# Patient Record
Sex: MALE | Race: WHITE | NOT HISPANIC OR LATINO | Employment: PART TIME | URBAN - METROPOLITAN AREA
[De-identification: names, ages, dates, MRNs, and addresses within clinical notes are randomized per-mention and may not be internally consistent; named-entity substitution may affect disease eponyms.]

---
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
## 2019-05-03 ENCOUNTER — OFFICE VISIT (OUTPATIENT)
Dept: MEDICAL GROUP | Facility: MEDICAL CENTER | Age: 19
End: 2019-05-03
Payer: COMMERCIAL

## 2019-05-03 VITALS
SYSTOLIC BLOOD PRESSURE: 126 MMHG | OXYGEN SATURATION: 100 % | WEIGHT: 190 LBS | DIASTOLIC BLOOD PRESSURE: 78 MMHG | TEMPERATURE: 98.2 F | BODY MASS INDEX: 24.38 KG/M2 | HEART RATE: 74 BPM | RESPIRATION RATE: 18 BRPM | HEIGHT: 74 IN

## 2019-05-03 DIAGNOSIS — Z00.00 HEALTHCARE MAINTENANCE: ICD-10-CM

## 2019-05-03 PROBLEM — J34.3 HYPERTROPHY OF NASAL TURBINATES: Status: RESOLVED | Noted: 2017-07-28 | Resolved: 2019-05-03

## 2019-05-03 PROBLEM — J34.2 DEVIATED NASAL SEPTUM: Status: RESOLVED | Noted: 2017-07-28 | Resolved: 2019-05-03

## 2019-05-03 PROCEDURE — 99395 PREV VISIT EST AGE 18-39: CPT | Performed by: FAMILY MEDICINE

## 2019-05-03 ASSESSMENT — PATIENT HEALTH QUESTIONNAIRE - PHQ9: CLINICAL INTERPRETATION OF PHQ2 SCORE: 0

## 2019-05-03 NOTE — ASSESSMENT & PLAN NOTE
Immunizations: up to date.     Patient tries to eat well and regularly exercises. He engages in cardiovascular exercise and strength training.

## 2019-05-03 NOTE — PROGRESS NOTES
"[facility]  Progress Note  Established Patient    Subjective:   Kumar López is a 19 y.o. male here today for a wellness exam. The patient is accompanied by his mother.     Healthcare maintenance  Immunizations: up to date.     Patient tries to eat well and regularly exercises. He engages in cardiovascular exercise and strength training.       Current Outpatient Prescriptions on File Prior to Visit   Medication Sig Dispense Refill   • therapeutic multivitamin-minerals (THERAGRAN-M) Tab Take 1 Tab by mouth every day.       No current facility-administered medications on file prior to visit.        Past Medical History:   Diagnosis Date   • Allergy     seasonal   • Unspecified orthopedic aftercare     pt with fractures of growth plates bilat legs       Allergies: Patient has no known allergies.    Surgical History:  has a past surgical history that includes other; wrist orif (10/24/2012); hardware removal ortho (11/5/2013); scar revision (11/5/2013); scar excision (11/5/2013); septoplasty (7/28/2017); turbinoplasty (Bilateral, 7/28/2017); and tonsillectomy.    Family History: family history includes Asthma in his father; Heart Disease in his unknown relative; Hyperlipidemia in his mother; Hypertension in his unknown relative; No Known Problems in his brother and sister.    Social History:  reports that he has never smoked. He has never used smokeless tobacco. He reports that he does not drink alcohol or use drugs.    ROS: no fever.        Objective:     Vitals:    05/03/19 1016   BP: 126/78   BP Location: Left arm   Patient Position: Sitting   BP Cuff Size: Adult   Pulse: 74   Resp: 18   Temp: 36.8 °C (98.2 °F)   TempSrc: Temporal   SpO2: 100%   Weight: 86.2 kg (190 lb)   Height: 1.88 m (6' 2\")       Physical Exam:  General: alert in no apparent distress.   Cardio: regular rate and rhythm, no murmurs, rubs or gallops.   Resp: CTAB no w/r/r.   ENMT: Tympanic members normal bilaterally.  No pharyngeal " erythema, no tonsillar exudates.  Uvula midline.  No anterior cervical lymphadenopathy.  GI: Soft, nontender, nondistended.        Assessment and Plan:     1. Healthcare maintenance  - see HPI.   - discussed diet and exercise.         Followup: Return in about 1 year (around 5/3/2020), or if symptoms worsen or fail to improve, for Wellness Visit, Long.

## 2019-08-29 ENCOUNTER — OFFICE VISIT (OUTPATIENT)
Dept: URGENT CARE | Facility: MEDICAL CENTER | Age: 19
End: 2019-08-29
Payer: COMMERCIAL

## 2019-08-29 VITALS
BODY MASS INDEX: 22.97 KG/M2 | TEMPERATURE: 98.3 F | SYSTOLIC BLOOD PRESSURE: 108 MMHG | WEIGHT: 179 LBS | HEIGHT: 74 IN | OXYGEN SATURATION: 98 % | HEART RATE: 73 BPM | DIASTOLIC BLOOD PRESSURE: 68 MMHG

## 2019-08-29 DIAGNOSIS — J00 NASOPHARYNGITIS ACUTE: ICD-10-CM

## 2019-08-29 LAB
FLUAV+FLUBV AG SPEC QL IA: NORMAL
INT CON NEG: NORMAL
INT CON POS: NORMAL

## 2019-08-29 PROCEDURE — 87804 INFLUENZA ASSAY W/OPTIC: CPT | Performed by: PHYSICIAN ASSISTANT

## 2019-08-29 PROCEDURE — 99214 OFFICE O/P EST MOD 30 MIN: CPT | Performed by: PHYSICIAN ASSISTANT

## 2019-08-29 RX ORDER — LIDOCAINE HYDROCHLORIDE 20 MG/ML
SOLUTION OROPHARYNGEAL
Qty: 100 ML | Refills: 0 | Status: SHIPPED | OUTPATIENT
Start: 2019-08-29 | End: 2019-08-30

## 2019-08-29 RX ORDER — BENZONATATE 100 MG/1
200 CAPSULE ORAL 3 TIMES DAILY PRN
Qty: 60 CAP | Refills: 0 | Status: SHIPPED | OUTPATIENT
Start: 2019-08-29 | End: 2019-08-30

## 2019-08-29 ASSESSMENT — ENCOUNTER SYMPTOMS
MYALGIAS: 1
COUGH: 1
DIARRHEA: 0
ABDOMINAL PAIN: 0
HEADACHES: 1
VOMITING: 0
SORE THROAT: 1
WHEEZING: 0
FEVER: 1
BODY ACHES: 1
SPUTUM PRODUCTION: 0
CHILLS: 1
FATIGUE: 1
NAUSEA: 0
SHORTNESS OF BREATH: 0

## 2019-08-30 ENCOUNTER — OFFICE VISIT (OUTPATIENT)
Dept: URGENT CARE | Facility: CLINIC | Age: 19
End: 2019-08-30
Payer: COMMERCIAL

## 2019-08-30 VITALS
BODY MASS INDEX: 22.77 KG/M2 | HEIGHT: 74 IN | OXYGEN SATURATION: 99 % | DIASTOLIC BLOOD PRESSURE: 70 MMHG | WEIGHT: 177.4 LBS | TEMPERATURE: 98 F | SYSTOLIC BLOOD PRESSURE: 110 MMHG | HEART RATE: 84 BPM | RESPIRATION RATE: 16 BRPM

## 2019-08-30 DIAGNOSIS — K20.90 ESOPHAGITIS: ICD-10-CM

## 2019-08-30 PROCEDURE — 99214 OFFICE O/P EST MOD 30 MIN: CPT | Performed by: PHYSICIAN ASSISTANT

## 2019-08-30 RX ORDER — SUCRALFATE ORAL 1 G/10ML
1 SUSPENSION ORAL
Qty: 280 ML | Refills: 0 | Status: SHIPPED | OUTPATIENT
Start: 2019-08-30 | End: 2019-09-06

## 2019-08-30 RX ORDER — RANITIDINE 150 MG/1
150 TABLET ORAL 2 TIMES DAILY
Qty: 28 TAB | Refills: 0 | Status: SHIPPED | OUTPATIENT
Start: 2019-08-30 | End: 2019-09-13

## 2019-08-30 NOTE — PROGRESS NOTES
"Chief Complaint   Patient presents with   • Chest Pain     x 4 days, mid chest pain, chest tightness, wheezing, shortness of breath, fatigue and bodyaches.  Lost 13 Lb  in 4 days       HISTORY OF PRESENT ILLNESS: Patient is a 19 y.o. male who presents today for about 4 days of worsening mid chest pain, fatigue, diarrhea, vomiting.   Patient states that he has only had a few episodes of vomiting and this came after he started having the symptoms of chest discomfort.  This discomfort is reliably noted with swallowing. He notes both pain and sensation that \"food is getting stuck\"  He notes this sensation with solids primarily but slightly with liquids.   He was seen in  and dx with viral URI however reports he really has not had any cold symptoms.   He does note some slight pleuritic pain, same area as pain when he swallows.   Has not really been coughing.  No overt abdominal pain.  No hx of GERD or reflux issues.  Lost about 12 pounds in last 4 days.    He was given tessalon for his coughing at  yesterday but has not helped.  No hematemesis.  No recent use of NSAIDs.  No tarry stools.     Patient Active Problem List    Diagnosis Date Noted   • Healthcare maintenance 05/03/2019       Allergies:Patient has no known allergies.    Current Outpatient Medications Ordered in Epic   Medication Sig Dispense Refill   • therapeutic multivitamin-minerals (THERAGRAN-M) Tab Take 1 Tab by mouth every day.       No current [facility]-ordered facility-administered medications on file.        Past Medical History:   Diagnosis Date   • Allergy     seasonal   • Unspecified orthopedic aftercare     pt with fractures of growth plates bilat legs       Social History     Tobacco Use   • Smoking status: Never Smoker   • Smokeless tobacco: Never Used   Substance Use Topics   • Alcohol use: No   • Drug use: No       Family Status   Relation Name Status   • Unknown  (Not Specified)   • Unknown  (Not Specified)   • Mo  Alive   • Fa  Alive   • Sis  " "Alive   • Bro  Alive     Family History   Problem Relation Age of Onset   • Hypertension Unknown    • Heart Disease Unknown    • Hyperlipidemia Mother    • Asthma Father    • No Known Problems Sister    • No Known Problems Brother        ROS:  Review of Systems   Constitutional: SEE HPI  HENT: Negative for ear pain, nosebleeds, congestion, sore throat and neck pain.    Eyes: Negative for blurred vision.   Respiratory: Negative for cough, sputum production, shortness of breath and wheezing.    Cardiovascular: SEE HPI  Gastrointestinal: SEE HPI  Genitourinary: Negative for dysuria, urgency and frequency.   All other systems reviewed and are negative.       Exam:  /70 (BP Location: Left arm, Patient Position: Sitting, BP Cuff Size: Large adult)   Pulse 100   Temp 36.7 °C (98 °F) (Temporal)   Resp 16   Ht 1.88 m (6' 2\")   Wt 80.5 kg (177 lb 6.4 oz)   SpO2 99%   General:  Well nourished, well developed male in NAD  Eyes: PERRLA, EOM within normal limits, no conjunctival injection, no scleral icterus, visual fields and acuity grossly intact.  Ears: Normal shape and symmetry, no tenderness, no discharge. External canals are without any significant edema or erythema. Tympanic membranes are without any inflammation, no effusion. Gross auditory acuity is intact  Nose: Symmetrical, sinuses without tenderness, no discharge.   Mouth: reasonable hygiene, no erythema exudates or tonsillar enlargement.  Neck: no masses, range of motion within normal limits, no tracheal deviation. No lymphadenopathy  Pulmonary: Normal respiratory effort, no wheezes, crackles, or rhonchi.  Cardiovascular: regular rate and rhythm without murmurs, rubs, or gallops.  Abdomen: Soft, nontender, nondistended. Normal bowel sounds. No hepatosplenomegaly or masses, or hernias. No rebound or guarding.  Skin: No visible rashes or lesion. Warm, pink, dry.   Extremities: no clubbing, cyanosis, or edema.  Neuro: A&O x 3. Speech normal/clear.  Normal " gait.         Assessment/Plan:  1. Esophagitis  hyoscyamine-maalox plus-lidocaine viscous (GI COCKTAIL)    raNITidine (ZANTAC) 150 MG Tab    sucralfate (CARAFATE) 1 GM/10ML Suspension    EKG       -vitals WNL. Patient is afebrile and well appearing.   -EKG without evidence of pericarditis or other acute cardiac changes.   -patient responded well to GI cocktail noting that his painful swallowing resolved completely with this.   -consistent with gastritis with possible esophagitis at this time with unclear etiology, possible viral.   Symptoms of dysphagia/odynophagia.    -trial of medication as above with plan to have GI follow prn.   -ER precautions for failure to improve for more work up advised.  Declines labs today.        Supportive care, differential diagnoses, and indications for immediate follow-up discussed with patient.   Pathogenesis of diagnosis discussed including typical length and natural progression.   Instructed to return to clinic or nearest emergency department for any change in condition, further concerns, or worsening of symptoms.  Patient states understanding of the plan of care and discharge instructions.  Instructed to make an appointment, for follow up, with their primary care provider.      Jailene Sears P.A.-C.

## 2019-08-30 NOTE — PROGRESS NOTES
"Subjective:   Kumar López is a 19 y.o. male who presents for Fatigue; Generalized Body Aches; and Pharyngitis    This is a new problem.  Patient presents urgent care with 2-day history of fatigue, generalized body aches, perceived fever, chills, sore throat and congestion.  Patient has had no known exposure to influenza.  He did just recently resume college courses and has been around a lot of people.  Patient did take some DayQuil for his symptoms with no real improvement in symptoms.      Fatigue   Associated symptoms include chills, congestion, coughing, fatigue, a fever, headaches, myalgias and a sore throat. Pertinent negatives include no abdominal pain, chest pain, nausea, rash or vomiting.   Generalized Body Aches   Associated symptoms include chills, congestion, coughing, fatigue, a fever, headaches, myalgias and a sore throat. Pertinent negatives include no abdominal pain, chest pain, nausea, rash or vomiting.   Pharyngitis    Associated symptoms include congestion, coughing and headaches. Pertinent negatives include no abdominal pain, diarrhea, ear pain, shortness of breath or vomiting.     Review of Systems   Constitutional: Positive for chills, fatigue, fever and malaise/fatigue.   HENT: Positive for congestion and sore throat. Negative for ear pain.    Respiratory: Positive for cough. Negative for sputum production, shortness of breath and wheezing.    Cardiovascular: Negative for chest pain.   Gastrointestinal: Negative for abdominal pain, diarrhea, nausea and vomiting.   Musculoskeletal: Positive for myalgias.   Skin: Negative for rash.   Neurological: Positive for headaches.   All other systems reviewed and are negative.    No Known Allergies     Objective:   /68 (BP Location: Right arm, Patient Position: Sitting)   Pulse 73   Temp 36.8 °C (98.3 °F) (Temporal)   Ht 1.88 m (6' 2\")   Wt 81.2 kg (179 lb)   SpO2 98%   BMI 22.98 kg/m²   Physical Exam   Constitutional: He is oriented to " person, place, and time. He appears well-developed and well-nourished. He does not appear ill. No distress.   HENT:   Head: Normocephalic and atraumatic.   Right Ear: Tympanic membrane and external ear normal.   Left Ear: Tympanic membrane and external ear normal.   Nose: Mucosal edema and rhinorrhea present. Right sinus exhibits no maxillary sinus tenderness and no frontal sinus tenderness. Left sinus exhibits no maxillary sinus tenderness and no frontal sinus tenderness.   Mouth/Throat: Uvula is midline and mucous membranes are normal. Posterior oropharyngeal erythema present. No oropharyngeal exudate. Tonsils are 1+ on the right. Tonsils are 1+ on the left. No tonsillar exudate.   EAC's with small amount of dried flaky cerumen, this is gently removed with ear curette but not fully removed   Eyes: Pupils are equal, round, and reactive to light. Conjunctivae and EOM are normal.   Neck: Normal range of motion. Neck supple.   Cardiovascular: Normal rate, regular rhythm and normal heart sounds. Exam reveals no friction rub.   No murmur heard.  Pulmonary/Chest: Effort normal and breath sounds normal. No respiratory distress.   Abdominal: Soft. Bowel sounds are normal. There is no hepatosplenomegaly. There is no tenderness.   Musculoskeletal: Normal range of motion.   Lymphadenopathy:        Head (right side): No submental, no submandibular and no tonsillar adenopathy present.        Head (left side): No submental, no submandibular and no tonsillar adenopathy present.     He has no cervical adenopathy.        Right: No supraclavicular adenopathy present.        Left: No supraclavicular adenopathy present.   Neurological: He is alert and oriented to person, place, and time. He has normal strength. No cranial nerve deficit or sensory deficit. Coordination normal.   Skin: Skin is warm and dry. No rash noted.   Psychiatric: He has a normal mood and affect. Judgment normal.   Vitals reviewed.          Assessment/Plan:    Assessment    1. Nasopharyngitis acute  - POCT Influenza A/B  - lidocaine (XYLOCAINE) 2 % Solution; Use as directed  Dispense: 100 mL; Refill: 0  - benzonatate (TESSALON) 100 MG Cap; Take 2 Caps by mouth 3 times a day as needed.  Dispense: 60 Cap; Refill: 0    Testing for influenza is negative.  Symptom medic, supportive care.  Increase fluids, rest.  Reassurance provided.  Patient may use over-the-counter cough and cold medication as he does not have a history of hypertension.  Patient is given a prescription for viscous lidocaine with printed instructions on how to create his own Magic mouthwash of which he may use a 10 mL solution as a swish gargle and spit every 6 hours as needed for sore throat.  Patient is also given Tessalon Perles as needed for cough.  Recommend nasal saline rinse, Flonase nasal spray and Mucinex.    Differential diagnosis, natural history, supportive care, and indications for immediate follow-up discussed.    If not improving in 3-5 days, F/U with PCP or return to  or sooner if worsens  Red flag warning symptoms and strict ER/follow-up precautions given.  The patient demonstrated a good understanding and agreed with the treatment plan.  Please note that this note was created using voice recognition speech to text software. Every effort has been made to correct obvious errors.  However, I expect there are errors of grammar and possibly context that were not discovered prior to finalizing the note  KARLO Renteria PA-C

## 2019-08-30 NOTE — PATIENT INSTRUCTIONS
"Viral Syndrome  You or your child has Viral Syndrome. It is the most common infection causing \"colds\" and infections in the nose, throat, sinuses, and breathing tubes. Sometimes the infection causes nausea, vomiting, or diarrhea. The germ that causes the infection is a virus. No antibiotic or other medicine will kill it. There are medicines that you can take to make you or your child more comfortable.   HOME CARE INSTRUCTIONS   · Rest in bed until you start to feel better.   · If you have diarrhea or vomiting, eat small amounts of crackers and toast. Soup is helpful.   · Do not give aspirin or medicine that contains aspirin to children.   · Only take over-the-counter or prescription medicines for pain, discomfort, or fever as directed by your caregiver.   SEEK IMMEDIATE MEDICAL CARE IF:   · You or your child has not improved within one week.   · You or your child has pain that is not at least partially relieved by over-the-counter medicine.   · Thick, colored mucus or blood is coughed up.   · Discharge from the nose becomes thick yellow or green.   · Diarrhea or vomiting gets worse.   · There is any major change in your or your child's condition.   · You or your child develops a skin rash, stiff neck, severe headache, or are unable to hold down food or fluid.   · You or your child has an oral temperature above 102° F (38.9° C), not controlled by medicine.   · Your baby is older than 3 months with a rectal temperature of 102° F (38.9° C) or higher.   · Your baby is 3 months old or younger with a rectal temperature of 100.4° F (38° C) or higher.   Document Released: 12/03/2007 Document Revised: 03/11/2013 Document Reviewed: 12/03/2008  [employer] Patient Information ©2013 [employer].  "

## 2019-08-31 ENCOUNTER — APPOINTMENT (OUTPATIENT)
Dept: RADIOLOGY | Facility: MEDICAL CENTER | Age: 19
End: 2019-08-31
Attending: EMERGENCY MEDICINE
Payer: COMMERCIAL

## 2019-08-31 ENCOUNTER — HOSPITAL ENCOUNTER (EMERGENCY)
Facility: MEDICAL CENTER | Age: 19
End: 2019-08-31
Attending: EMERGENCY MEDICINE
Payer: COMMERCIAL

## 2019-08-31 VITALS
TEMPERATURE: 98.2 F | RESPIRATION RATE: 16 BRPM | HEART RATE: 70 BPM | DIASTOLIC BLOOD PRESSURE: 72 MMHG | WEIGHT: 175.93 LBS | OXYGEN SATURATION: 98 % | HEIGHT: 74 IN | BODY MASS INDEX: 22.58 KG/M2 | SYSTOLIC BLOOD PRESSURE: 129 MMHG

## 2019-08-31 DIAGNOSIS — K21.9 GASTROESOPHAGEAL REFLUX DISEASE, ESOPHAGITIS PRESENCE NOT SPECIFIED: ICD-10-CM

## 2019-08-31 DIAGNOSIS — K44.9 HIATAL HERNIA: ICD-10-CM

## 2019-08-31 LAB
ALBUMIN SERPL BCP-MCNC: 4.9 G/DL (ref 3.2–4.9)
ALBUMIN/GLOB SERPL: 1.7 G/DL
ALP SERPL-CCNC: 66 U/L (ref 30–99)
ALT SERPL-CCNC: 11 U/L (ref 2–50)
ANION GAP SERPL CALC-SCNC: 14 MMOL/L (ref 0–11.9)
AST SERPL-CCNC: 12 U/L (ref 12–45)
BASOPHILS # BLD AUTO: 0.3 % (ref 0–1.8)
BASOPHILS # BLD: 0.03 K/UL (ref 0–0.12)
BILIRUB SERPL-MCNC: 0.9 MG/DL (ref 0.1–1.5)
BUN SERPL-MCNC: 14 MG/DL (ref 8–22)
CALCIUM SERPL-MCNC: 9.7 MG/DL (ref 8.5–10.5)
CHLORIDE SERPL-SCNC: 101 MMOL/L (ref 96–112)
CO2 SERPL-SCNC: 23 MMOL/L (ref 20–33)
CREAT SERPL-MCNC: 0.96 MG/DL (ref 0.5–1.4)
EOSINOPHIL # BLD AUTO: 0.13 K/UL (ref 0–0.51)
EOSINOPHIL NFR BLD: 1.4 % (ref 0–6.9)
ERYTHROCYTE [DISTWIDTH] IN BLOOD BY AUTOMATED COUNT: 36 FL (ref 35.9–50)
GLOBULIN SER CALC-MCNC: 2.9 G/DL (ref 1.9–3.5)
GLUCOSE SERPL-MCNC: 96 MG/DL (ref 65–99)
HCT VFR BLD AUTO: 50.8 % (ref 42–52)
HGB BLD-MCNC: 18 G/DL (ref 14–18)
IMM GRANULOCYTES # BLD AUTO: 0.01 K/UL (ref 0–0.11)
IMM GRANULOCYTES NFR BLD AUTO: 0.1 % (ref 0–0.9)
LIPASE SERPL-CCNC: 49 U/L (ref 11–82)
LYMPHOCYTES # BLD AUTO: 1.61 K/UL (ref 1–4.8)
LYMPHOCYTES NFR BLD: 17.7 % (ref 22–41)
MCH RBC QN AUTO: 29.7 PG (ref 27–33)
MCHC RBC AUTO-ENTMCNC: 35.4 G/DL (ref 33.7–35.3)
MCV RBC AUTO: 83.8 FL (ref 81.4–97.8)
MONOCYTES # BLD AUTO: 1.06 K/UL (ref 0–0.85)
MONOCYTES NFR BLD AUTO: 11.6 % (ref 0–13.4)
NEUTROPHILS # BLD AUTO: 6.27 K/UL (ref 1.82–7.42)
NEUTROPHILS NFR BLD: 68.9 % (ref 44–72)
NRBC # BLD AUTO: 0 K/UL
NRBC BLD-RTO: 0 /100 WBC
PLATELET # BLD AUTO: 208 K/UL (ref 164–446)
PMV BLD AUTO: 9.8 FL (ref 9–12.9)
POTASSIUM SERPL-SCNC: 3.8 MMOL/L (ref 3.6–5.5)
PROT SERPL-MCNC: 7.8 G/DL (ref 6–8.2)
RBC # BLD AUTO: 6.06 M/UL (ref 4.7–6.1)
SODIUM SERPL-SCNC: 138 MMOL/L (ref 135–145)
TROPONIN T SERPL-MCNC: <6 NG/L (ref 6–19)
WBC # BLD AUTO: 9.1 K/UL (ref 4.8–10.8)

## 2019-08-31 PROCEDURE — 80053 COMPREHEN METABOLIC PANEL: CPT

## 2019-08-31 PROCEDURE — 71275 CT ANGIOGRAPHY CHEST: CPT

## 2019-08-31 PROCEDURE — 700117 HCHG RX CONTRAST REV CODE 255: Performed by: EMERGENCY MEDICINE

## 2019-08-31 PROCEDURE — A9270 NON-COVERED ITEM OR SERVICE: HCPCS | Performed by: EMERGENCY MEDICINE

## 2019-08-31 PROCEDURE — C9113 INJ PANTOPRAZOLE SODIUM, VIA: HCPCS | Performed by: EMERGENCY MEDICINE

## 2019-08-31 PROCEDURE — 700111 HCHG RX REV CODE 636 W/ 250 OVERRIDE (IP): Performed by: EMERGENCY MEDICINE

## 2019-08-31 PROCEDURE — 99284 EMERGENCY DEPT VISIT MOD MDM: CPT

## 2019-08-31 PROCEDURE — 83690 ASSAY OF LIPASE: CPT

## 2019-08-31 PROCEDURE — 96374 THER/PROPH/DIAG INJ IV PUSH: CPT

## 2019-08-31 PROCEDURE — 700102 HCHG RX REV CODE 250 W/ 637 OVERRIDE(OP): Performed by: EMERGENCY MEDICINE

## 2019-08-31 PROCEDURE — 84484 ASSAY OF TROPONIN QUANT: CPT

## 2019-08-31 PROCEDURE — 85025 COMPLETE CBC W/AUTO DIFF WBC: CPT

## 2019-08-31 RX ORDER — PANTOPRAZOLE SODIUM 40 MG/1
40 TABLET, DELAYED RELEASE ORAL DAILY
Qty: 30 TAB | Refills: 0 | Status: SHIPPED | OUTPATIENT
Start: 2019-08-31 | End: 2020-09-04

## 2019-08-31 RX ORDER — PANTOPRAZOLE SODIUM 40 MG/10ML
40 INJECTION, POWDER, LYOPHILIZED, FOR SOLUTION INTRAVENOUS ONCE
Status: COMPLETED | OUTPATIENT
Start: 2019-08-31 | End: 2019-08-31

## 2019-08-31 RX ADMIN — IOHEXOL 80 ML: 350 INJECTION, SOLUTION INTRAVENOUS at 15:35

## 2019-08-31 RX ADMIN — PANTOPRAZOLE SODIUM 40 MG: 40 INJECTION, POWDER, LYOPHILIZED, FOR SOLUTION INTRAVENOUS at 14:35

## 2019-08-31 RX ADMIN — LIDOCAINE HYDROCHLORIDE 30 ML: 20 SOLUTION OROPHARYNGEAL at 14:10

## 2019-08-31 NOTE — ED PROVIDER NOTES
ED Provider Note    Scribed for Joe Singh M.D. by Mckenzie Sanchez. 8/31/2019, 1:34 PM.    Primary care provider: Jesse Ralph M.D.  Means of arrival: Walk-In  History obtained from: Patient  History limited by: None    CHIEF COMPLAINT  Chief Complaint   Patient presents with   • Gastrophageal Reflux     seen by UC twice in last 3 days, dx with possible esophagitis, given several meds that havent helped at all, everything he eats comes back up right away     HPI  Kumar López is a 19 y.o. male who presents to the Emergency Department for esophagus pain approximately 5 days ago. Initially he reports chest pain, fatigue, chills, and fever and thought his symptoms were related to the common cold. The patient states that he went to urgent care on 8/29/19 and was told his symptoms were cold related however his symptoms persisted and worsened. He returned to urgent care yesterday, where an EKG was performed to check for pericarditis. Currently, he endorses vomiting, chest pain, difficulty breathing, and difficulty swallowing. He states that he is able to drink fluids with moderate discomfort, however, is not able to eat solid foods without emesis. He denies any cough, abdominal pain, sore throat, or rhinorrhea. His mother reports that the patient has lost 15 lbs in the last week. He states that he has not recently taken any anti-inflammatory medication and has no prior history of ulcers or acid reflex.    REVIEW OF SYSTEMS  Pertinent positives include  fatigue, sweats, vomiting, fevers, chills, chest pain, difficulty breathing, and difficulty swallowing. Pertinent negatives include no cough, sore throat, abdominal pain, or rhinorrhea. All other systems reviewed and negative.    PAST MEDICAL HISTORY   has a past medical history of Allergy and Unspecified orthopedic aftercare.    SURGICAL HISTORY   has a past surgical history that includes other; wrist orif (10/24/2012); hardware removal ortho (11/5/2013);  "scar revision (11/5/2013); scar excision (11/5/2013); septoplasty (7/28/2017); turbinoplasty (Bilateral, 7/28/2017); and tonsillectomy.    SOCIAL HISTORY  Social History     Tobacco Use   • Smoking status: Never Smoker   • Smokeless tobacco: Never Used   Substance Use Topics   • Alcohol use: No   • Drug use: No      Social History     Substance and Sexual Activity   Drug Use No       FAMILY HISTORY  Family History   Problem Relation Age of Onset   • Hypertension Other    • Heart Disease Other    • Hyperlipidemia Mother    • Asthma Father    • No Known Problems Sister    • No Known Problems Brother        CURRENT MEDICATIONS  Home Medications     Reviewed by Alton Weir R.N. (Registered Nurse) on 08/31/19 at 1258  Med List Status: Complete   Medication Last Dose Status   raNITidine (ZANTAC) 150 MG Tab  Active   sucralfate (CARAFATE) 1 GM/10ML Suspension  Active   therapeutic multivitamin-minerals (THERAGRAN-M) Tab  Active                ALLERGIES  No Known Allergies    PHYSICAL EXAM  VITAL SIGNS: /86   Pulse 98   Temp 36.8 °C (98.2 °F) (Temporal)   Resp 16   Ht 1.88 m (6' 2\")   Wt 79.8 kg (175 lb 14.8 oz)   SpO2 98%   BMI 22.59 kg/m²     Constitutional: Well developed, Well nourished, No acute distress, Non-toxic appearance.   HENT: Normocephalic, Atraumatic, TMs normal, mucous membranes moist, no erythema, exudates, swelling, or masses, nares patent  Eyes: nonicteric  Neck: Supple, no meningismus  Lymphatic: No lymphadenopathy noted.   Cardiovascular: Regular rate and rhythm, no gallops rubs or murmurs  Lungs: Clear bilaterally   Abdomen: Bowel sounds normal, Soft, No tenderness  Skin: Warm, Dry, no rash  Genitalia: Deferred  Rectal: Deferred  Extremities: No edema  Neurologic: Alert, appropriate, follows commands, moving all extremities, normal speech   Psychiatric: Affect normal      DIAGNOSTIC STUDIES / PROCEDURES    LABS  Results for orders placed or performed during the hospital encounter of " 08/31/19   CBC WITH DIFFERENTIAL   Result Value Ref Range    WBC 9.1 4.8 - 10.8 K/uL    RBC 6.06 4.70 - 6.10 M/uL    Hemoglobin 18.0 14.0 - 18.0 g/dL    Hematocrit 50.8 42.0 - 52.0 %    MCV 83.8 81.4 - 97.8 fL    MCH 29.7 27.0 - 33.0 pg    MCHC 35.4 (H) 33.7 - 35.3 g/dL    RDW 36.0 35.9 - 50.0 fL    Platelet Count 208 164 - 446 K/uL    MPV 9.8 9.0 - 12.9 fL    Neutrophils-Polys 68.90 44.00 - 72.00 %    Lymphocytes 17.70 (L) 22.00 - 41.00 %    Monocytes 11.60 0.00 - 13.40 %    Eosinophils 1.40 0.00 - 6.90 %    Basophils 0.30 0.00 - 1.80 %    Immature Granulocytes 0.10 0.00 - 0.90 %    Nucleated RBC 0.00 /100 WBC    Neutrophils (Absolute) 6.27 1.82 - 7.42 K/uL    Lymphs (Absolute) 1.61 1.00 - 4.80 K/uL    Monos (Absolute) 1.06 (H) 0.00 - 0.85 K/uL    Eos (Absolute) 0.13 0.00 - 0.51 K/uL    Baso (Absolute) 0.03 0.00 - 0.12 K/uL    Immature Granulocytes (abs) 0.01 0.00 - 0.11 K/uL    NRBC (Absolute) 0.00 K/uL   COMP METABOLIC PANEL   Result Value Ref Range    Sodium 138 135 - 145 mmol/L    Potassium 3.8 3.6 - 5.5 mmol/L    Chloride 101 96 - 112 mmol/L    Co2 23 20 - 33 mmol/L    Anion Gap 14.0 (H) 0.0 - 11.9    Glucose 96 65 - 99 mg/dL    Bun 14 8 - 22 mg/dL    Creatinine 0.96 0.50 - 1.40 mg/dL    Calcium 9.7 8.5 - 10.5 mg/dL    AST(SGOT) 12 12 - 45 U/L    ALT(SGPT) 11 2 - 50 U/L    Alkaline Phosphatase 66 30 - 99 U/L    Total Bilirubin 0.9 0.1 - 1.5 mg/dL    Albumin 4.9 3.2 - 4.9 g/dL    Total Protein 7.8 6.0 - 8.2 g/dL    Globulin 2.9 1.9 - 3.5 g/dL    A-G Ratio 1.7 g/dL   LIPASE   Result Value Ref Range    Lipase 49 11 - 82 U/L   TROPONIN   Result Value Ref Range    Troponin T <6 6 - 19 ng/L   ESTIMATED GFR   Result Value Ref Range    GFR If African American >60 >60 mL/min/1.73 m 2    GFR If Non African American >60 >60 mL/min/1.73 m 2     All labs reviewed by me.    EKG  Obtained at 8/30/19 at 3:38 PM  Normal Sinus rhythm   Rate 72  Axis normal   Intervals normal   J point elevation anteriorly   No ST segment  elevation or depression.     Impression: Does indicate ischemia at this time. No prior EKG for comparison.      RADIOLOGY  CT-CTA CHEST PULMONARY ARTERY W/ RECONS   Final Result      1.  No evidence of pulmonary embolism.      2.  Clear lung parenchyma.      3.  Small hiatal hernia.              The radiologist's interpretation of all radiological studies have been reviewed by me.    COURSE & MEDICAL DECISION MAKING  Nursing notes, VS, PMSFHx reviewed in chart.     1:34 PM - Patient seen and examined at bedside. I informed the patient the need for labs and radiology to rule out any emergent processes such as esophagitis and adenopathy. Currently awaiting labs and radiology results before deciding if intervention is necessary. Ordered for CT-CTA chest pulmonary artery, CBC with differential, troponin, CMP and lipase to evaluate. Patient was treated with GI cocktail 30 mL and Protonix 40 mg injection for his symptoms.       Decision Making:  This is a 19 y.o. year old male who presents with what appears to be reflux as well as a hiatal hernia.  He did also mention pleuritic pain but no definite shortness of breath.  The patient underwent CT angios the chest to exclude PE in addition to excluding possible esophagitis and/or adenopathy that may suggest esophageal inflammation.  Patient does have a hiatal hernia but does not have any evidence of thickening of the esophagus or lymph node enlargement.  There is no evidence of pericardial effusion and there is no evidence of pericarditis on EKG.  Troponin is negative suggesting against myocarditis.  Patient has no evidence of pneumonia.  Labs are otherwise reassuring with no evidence of pancreatitis hepatitis etc.  Patient will be started on a PPI and referred to gastroenterology consultants.    FINAL IMPRESSION  1. Gastroesophageal reflux disease, esophagitis presence not specified    2. Hiatal hernia          IMckenzie)huan scribing for, and in the  presence of, Joe Singh M.D..    Electronically signed by: Mckenzie Sanchez (Scribe), 8/31/2019    I, Joe Singh M.D. personally performed the services described in this documentation, as scribed by Mckenzie Sanchez in my presence, and it is both accurate and complete. C.     The note accurately reflects work and decisions made by me.  Joe Singh  8/31/2019  3:53 PM

## 2019-08-31 NOTE — ED TRIAGE NOTES
Kumar López  19 y.o.  Chief Complaint   Patient presents with   • Gastrophageal Reflux     seen by  twice in last 3 days, dx with possible esophagitis, given several meds that havent helped at all, everything he eats comes back up right away     Patient ambulatory with parent to triage room with above complaint.  Patient appears in mild discomfort.  Did have some relief with GI cocktail at  yesterday but when it wears off symptoms come back.  Has been taking carafate, ranitidine.      Patient escorted to the lobby and instructed to notify staff of any changes in condition.

## 2019-08-31 NOTE — DISCHARGE INSTRUCTIONS
Follow-up with gastroenterology consultants.  Return for new or concerning symptoms in the interim.

## 2019-08-31 NOTE — ED NOTES
Discharged per pedis.  Ambulates with steady gait.  He and his mother verbalize understanding of his DC instructions and prescription.

## 2020-05-27 ENCOUNTER — TELEPHONE (OUTPATIENT)
Dept: HEALTH INFORMATION MANAGEMENT | Facility: OTHER | Age: 20
End: 2020-05-27

## 2020-05-27 NOTE — TELEPHONE ENCOUNTER
1. Caller Name: Kumar López                 Call Back Number: [phone]  [facility] PCP or Specialty Provider: Yes         2.  Does patient have any new or worsening symptoms of respiratory illness? Yes, the patient reports the following respiratory symptoms: chills and sore throat, fatigue, nausea with one episode of vomiting.  He says the symptoms started about 2 days ago.      3.  Does patient have any comoribidities? None     4.  Has the patient traveled in the last 14 days OR had any known contact with someone who is suspected or confirmed to have COVID-19?  No.    5. Disposition: Advised to perform self care, monitor for worsening symptoms and to call back in 3 days if no improvement    Note routed to [facility] Provider: FYI only.

## 2020-09-04 ENCOUNTER — OFFICE VISIT (OUTPATIENT)
Dept: MEDICAL GROUP | Facility: MEDICAL CENTER | Age: 20
End: 2020-09-04
Payer: COMMERCIAL

## 2020-09-04 VITALS
BODY MASS INDEX: 26.45 KG/M2 | RESPIRATION RATE: 18 BRPM | TEMPERATURE: 98.3 F | WEIGHT: 199.6 LBS | HEIGHT: 73 IN | DIASTOLIC BLOOD PRESSURE: 78 MMHG | HEART RATE: 78 BPM | SYSTOLIC BLOOD PRESSURE: 122 MMHG | OXYGEN SATURATION: 97 %

## 2020-09-04 DIAGNOSIS — Z00.00 HEALTHCARE MAINTENANCE: ICD-10-CM

## 2020-09-04 DIAGNOSIS — Z23 NEED FOR VACCINATION: ICD-10-CM

## 2020-09-04 DIAGNOSIS — Z13.6 SCREENING FOR ISCHEMIC HEART DISEASE: ICD-10-CM

## 2020-09-04 PROCEDURE — 90471 IMMUNIZATION ADMIN: CPT | Performed by: FAMILY MEDICINE

## 2020-09-04 PROCEDURE — 99395 PREV VISIT EST AGE 18-39: CPT | Mod: 25 | Performed by: FAMILY MEDICINE

## 2020-09-04 PROCEDURE — 90621 MENB-FHBP VACC 2/3 DOSE IM: CPT | Performed by: FAMILY MEDICINE

## 2020-09-04 ASSESSMENT — FIBROSIS 4 INDEX: FIB4 SCORE: 0.37

## 2020-09-04 ASSESSMENT — LIFESTYLE VARIABLES
DO YOUR FAMILY OR FRIENDS EVER TELL YOU THAT YOU SHOULD CUT DOWN ON YOUR DRINKING OR DRUG USE: NO
PART A TOTAL SCORE: 24
HAVE YOU EVER GOTTEN IN TROUBLE WHILE YOU WERE USING ALCOHOL OR DRUGS: NO
DO YOU EVER USE ALCOHOL OR DRUGS WHILE YOU ARE BY YOURSELF ALONE: NO
DURING THE PAST 12 MONTHS, ON HOW MANY DAYS DID YOU USE ANY TOBACCO OR NICOTINE PRODUCTS: 0
DO YOU EVER USE ALCOHOL OR DRUGS TO RELAX, FEEL BETTER ABOUT YOURSELF, OR FIT IN: NO
HAVE YOU EVER RIDDEN IN A CAR DRIVEN BY SOMEONE WHO WAS HIGH OR HAD BEEN USING ALCOHOL OR DRUGS: NO
DURING THE PAST 12 MONTHS, ON HOW MANY DAYS DID YOU DRINK MORE THAN A FEW SIPS OF BEER, WINE, OR ANY DRINK CONTAINING ALCOHOL: 24
DURING THE PAST 12 MONTHS, ON HOW MANY DAYS DID YOU USE ANY MARIJUANA: 0
DURING THE PAST 12 MONTHS, ON HOW MANY DAYS DID YOU USE ANYTHING ELSE TO GET HIGH: 0
DO YOU EVER FORGET THINGS YOU DID WHILE USING ALCOHOL OR DRUGS: NO

## 2020-09-04 ASSESSMENT — PATIENT HEALTH QUESTIONNAIRE - PHQ9: CLINICAL INTERPRETATION OF PHQ2 SCORE: 0

## 2020-09-04 NOTE — PROGRESS NOTES
"[facility]  Progress Note  Established Patient    Subjective:   Kumar López is a 20 y.o. male here today for a wellness visit. The patient is alone.     Healthcare maintenance  Lipid panel: ordered.   Fasting glucose: ordered.      Current Outpatient Medications on File Prior to Visit   Medication Sig Dispense Refill   • therapeutic multivitamin-minerals (THERAGRAN-M) Tab Take 1 Tab by mouth every day.       No current facility-administered medications on file prior to visit.        Past Medical History:   Diagnosis Date   • Allergy     seasonal   • Unspecified orthopedic aftercare     pt with fractures of growth plates bilat legs       Allergies: Patient has no known allergies.    Surgical History:  has a past surgical history that includes other; wrist orif (10/24/2012); hardware removal ortho (11/5/2013); scar revision (11/5/2013); scar excision (11/5/2013); septoplasty (7/28/2017); turbinoplasty (Bilateral, 7/28/2017); and tonsillectomy.    Family History: family history includes Asthma in his father; Heart Disease in his mother and another family member; Hyperlipidemia in his mother; Hypertension in an other family member; No Known Problems in his brother and sister.    Social History:  reports that he has never smoked. He has never used smokeless tobacco. He reports current alcohol use. He reports that he does not use drugs.    ROS: no fever or cough.        Objective:     Vitals:    09/04/20 0909   BP: 122/78   BP Location: Left arm   Patient Position: Sitting   BP Cuff Size: Large adult   Pulse: 78   Resp: 18   Temp: 36.8 °C (98.3 °F)   TempSrc: Temporal   SpO2: 97%   Weight: 90.5 kg (199 lb 9.6 oz)   Height: 1.842 m (6' 0.5\")       Physical Exam:  General: alert in no apparent distress.   Cardio: regular rate and rhythm, no murmurs, rubs or gallops.   Resp: CTAB no w/r/r.         Assessment and Plan:     1. Need for vaccination  - Meningococcal Vaccine (IM) Group B    2. Screening for " ischemic heart disease  Has FHx.   - Lipid panel.     3. Healthcare maintenance  -The HPI.  -Age-appropriate anticipatory guidance discussed including diet and exercise.  - Blood Glucose; Future  - Lipid Profile; Future        Followup: Return in about 1 year (around 9/4/2021), or if symptoms worsen or fail to improve, for Wellness Visit, Long.

## 2020-10-22 ENCOUNTER — HOSPITAL ENCOUNTER (OUTPATIENT)
Dept: LAB | Facility: MEDICAL CENTER | Age: 20
End: 2020-10-22
Attending: FAMILY MEDICINE
Payer: COMMERCIAL

## 2020-10-22 DIAGNOSIS — Z00.00 HEALTHCARE MAINTENANCE: ICD-10-CM

## 2020-10-22 LAB
CHOLEST SERPL-MCNC: 137 MG/DL (ref 100–199)
FASTING STATUS PATIENT QL REPORTED: NORMAL
GLUCOSE SERPL-MCNC: 97 MG/DL (ref 65–99)
HDLC SERPL-MCNC: 44 MG/DL
LDLC SERPL CALC-MCNC: 85 MG/DL
TRIGL SERPL-MCNC: 40 MG/DL (ref 0–149)

## 2020-10-22 PROCEDURE — 80061 LIPID PANEL: CPT

## 2020-10-22 PROCEDURE — 36415 COLL VENOUS BLD VENIPUNCTURE: CPT

## 2020-10-22 PROCEDURE — 82947 ASSAY GLUCOSE BLOOD QUANT: CPT

## 2021-04-28 ENCOUNTER — IMMUNIZATION (OUTPATIENT)
Dept: FAMILY PLANNING/WOMEN'S HEALTH CLINIC | Facility: IMMUNIZATION CENTER | Age: 21
End: 2021-04-28
Payer: COMMERCIAL

## 2021-04-28 DIAGNOSIS — Z23 ENCOUNTER FOR VACCINATION: Primary | ICD-10-CM

## 2021-04-28 PROCEDURE — 91300 PFIZER SARS-COV-2 VACCINE: CPT | Performed by: INTERNAL MEDICINE

## 2021-04-28 PROCEDURE — 0001A PFIZER SARS-COV-2 VACCINE: CPT | Performed by: INTERNAL MEDICINE

## 2021-05-20 ENCOUNTER — IMMUNIZATION (OUTPATIENT)
Dept: FAMILY PLANNING/WOMEN'S HEALTH CLINIC | Facility: IMMUNIZATION CENTER | Age: 21
End: 2021-05-20
Payer: COMMERCIAL

## 2021-05-20 DIAGNOSIS — Z23 ENCOUNTER FOR VACCINATION: Primary | ICD-10-CM

## 2021-05-20 PROCEDURE — 0002A PFIZER SARS-COV-2 VACCINE: CPT

## 2021-05-20 PROCEDURE — 91300 PFIZER SARS-COV-2 VACCINE: CPT

## 2021-10-08 ENCOUNTER — OFFICE VISIT (OUTPATIENT)
Dept: MEDICAL GROUP | Facility: MEDICAL CENTER | Age: 21
End: 2021-10-08
Payer: COMMERCIAL

## 2021-10-08 VITALS
SYSTOLIC BLOOD PRESSURE: 122 MMHG | OXYGEN SATURATION: 98 % | BODY MASS INDEX: 26.05 KG/M2 | HEIGHT: 74 IN | RESPIRATION RATE: 12 BRPM | HEART RATE: 73 BPM | DIASTOLIC BLOOD PRESSURE: 80 MMHG | TEMPERATURE: 98.9 F | WEIGHT: 203 LBS

## 2021-10-08 DIAGNOSIS — Z00.00 HEALTHCARE MAINTENANCE: ICD-10-CM

## 2021-10-08 DIAGNOSIS — Z23 NEED FOR VACCINATION: ICD-10-CM

## 2021-10-08 DIAGNOSIS — B07.8 OTHER VIRAL WARTS: ICD-10-CM

## 2021-10-08 PROCEDURE — 90472 IMMUNIZATION ADMIN EACH ADD: CPT | Performed by: FAMILY MEDICINE

## 2021-10-08 PROCEDURE — 99395 PREV VISIT EST AGE 18-39: CPT | Mod: 25 | Performed by: FAMILY MEDICINE

## 2021-10-08 PROCEDURE — 90471 IMMUNIZATION ADMIN: CPT | Performed by: FAMILY MEDICINE

## 2021-10-08 PROCEDURE — 17110 DESTRUCTION B9 LES UP TO 14: CPT | Performed by: FAMILY MEDICINE

## 2021-10-08 PROCEDURE — 90621 MENB-FHBP VACC 2/3 DOSE IM: CPT | Performed by: FAMILY MEDICINE

## 2021-10-08 PROCEDURE — 90686 IIV4 VACC NO PRSV 0.5 ML IM: CPT | Performed by: FAMILY MEDICINE

## 2021-10-08 ASSESSMENT — LIFESTYLE VARIABLES
DO YOU EVER USE ALCOHOL OR DRUGS TO RELAX, FEEL BETTER ABOUT YOURSELF, OR FIT IN: NO
DO YOU EVER USE ALCOHOL OR DRUGS WHILE YOU ARE BY YOURSELF ALONE: NO
HAVE YOU EVER RIDDEN IN A CAR DRIVEN BY SOMEONE WHO WAS HIGH OR HAD BEEN USING ALCOHOL OR DRUGS: NO
DURING THE PAST 12 MONTHS, ON HOW MANY DAYS DID YOU USE ANY TOBACCO OR NICOTINE PRODUCTS: 0
PART A TOTAL SCORE: 12
DURING THE PAST 12 MONTHS, ON HOW MANY DAYS DID YOU USE ANY MARIJUANA: 0
DO YOUR FAMILY OR FRIENDS EVER TELL YOU THAT YOU SHOULD CUT DOWN ON YOUR DRINKING OR DRUG USE: NO
DO YOU EVER FORGET THINGS YOU DID WHILE USING ALCOHOL OR DRUGS: NO
DURING THE PAST 12 MONTHS, ON HOW MANY DAYS DID YOU DRINK MORE THAN A FEW SIPS OF BEER, WINE, OR ANY DRINK CONTAINING ALCOHOL: 12
HAVE YOU EVER GOTTEN IN TROUBLE WHILE YOU WERE USING ALCOHOL OR DRUGS: NO
DURING THE PAST 12 MONTHS, ON HOW MANY DAYS DID YOU USE ANYTHING ELSE TO GET HIGH: 0

## 2021-10-08 ASSESSMENT — PATIENT HEALTH QUESTIONNAIRE - PHQ9: CLINICAL INTERPRETATION OF PHQ2 SCORE: 0

## 2021-10-08 NOTE — ASSESSMENT & PLAN NOTE
For the past 3 years the patient has noticed a skin lesion on the dorsal aspect of the left hand that is not bothersome or itchy.

## 2021-10-08 NOTE — PROGRESS NOTES
"[facility]  Progress Note  Established Patient    Subjective:   Kumar López is a 21 y.o. male here today for a wellness visit. The patient is alone.     Healthcare maintenance  Lipid panel: done 2020 and normal.   Fasting glucose: done 2020 and normal.     Other viral warts  For the past 3 years the patient has noticed a skin lesion on the dorsal aspect of the left hand that is not bothersome or itchy.      Current Outpatient Medications on File Prior to Visit   Medication Sig Dispense Refill   • therapeutic multivitamin-minerals (THERAGRAN-M) Tab Take 1 Tab by mouth every day.       No current facility-administered medications on file prior to visit.          Objective:     Vitals:    10/08/21 1053   BP: 122/80   BP Location: Left arm   Patient Position: Sitting   BP Cuff Size: Adult   Pulse: 73   Resp: 12   Temp: 37.2 °C (98.9 °F)   TempSrc: Temporal   SpO2: 98%   Weight: 92.1 kg (203 lb)   Height: 1.88 m (6' 2\")       Physical Exam:  General: alert in no apparent distress.   Cardio: RRR.   Resp: CTAB.   ENMT: no thyromegaly.   Abd: soft, NTND.   Ext: no pedal edema.   Skin: There is a small crusted skin lesion on the left dorsal hand.  This has the appearance of a wart.    Cryotherapy Procedure Note:  I discussed the risks and benefits of cryotherapy with the patient who gave verbal consent for the procedure. Three applications of cryotherapy were applied to the skin lesion described above. The patient tolerated the procedure well and there were no complications. Aftercare was discussed.      Assessment and Plan:     1. Need for vaccination  - INFLUENZA VACCINE QUAD INJ (PF)  - Meningococcal Vaccine Serogroup B 2-3 Dose (TRUMENBA)    2. Other viral warts  I suspect this is a nonresolving wart.  Cryotherapy was performed today.  I informed the patient that if the lesion does not 100% resolve within 3 months, he should return.  At that point I would perform a biopsy.    3. Healthcare " maintenance  - see HPI.   - Immunizations given today, see above.  - Age-appropriate anticipatory guidance discussed including diet and exercise.        Followup: Return in about 1 year (around 10/8/2022), or if symptoms worsen or fail to improve, for Wellness Visit, Long.

## (undated) DEVICE — SYRINGE DISP. 12 CC LL - (100/BX)

## (undated) DEVICE — ELECTRODE 850 FOAM ADHESIVE - HYDROGEL RADIOTRNSPRNT (50/PK)

## (undated) DEVICE — CATHETER IV 14 GA X 2 ---SURG.& SDS ONLY---(200EA/CA)

## (undated) DEVICE — KIT ANESTHESIA W/CIRCUIT & 3/LT BAG W/FILTER (20EA/CA)

## (undated) DEVICE — SENSOR SPO2 NEO LNCS ADHESIVE (20/BX) SEE USER NOTES

## (undated) DEVICE — SUTURE 4-0 VICRYL PLUS P-3 18 (12PK/BX)"

## (undated) DEVICE — PROTECTOR ULNA NERVE - (36PR/CA)

## (undated) DEVICE — MASK ANESTHESIA ADULT  - (100/CA)

## (undated) DEVICE — SPLINT NASAL DOYLE AIRWAY (2EA/ST)

## (undated) DEVICE — LEAD SET 6 DISP. EKG NIHON KOHDEN

## (undated) DEVICE — BOVIE FOOT CONTROL SUCTION - 6IN 10FR (25EA/CA)

## (undated) DEVICE — SET LEADWIRE 5 LEAD BEDSIDE DISPOSABLE ECG (1SET OF 5/EA)

## (undated) DEVICE — KIT SURGIFLO W/OUT THROMBIN - (6EA/CA)

## (undated) DEVICE — SODIUM CHL IRRIGATION 0.9% 1000ML (12EA/CA)

## (undated) DEVICE — ELECTRODE DUAL RETURN W/ CORD - (50/PK)

## (undated) DEVICE — CANISTER SUCTION RIGID RED 1500CC (40EA/CA)

## (undated) DEVICE — NEPTUNE 4 PORT MANIFOLD - (20/PK)

## (undated) DEVICE — HEAD HOLDER JUNIOR/ADULT

## (undated) DEVICE — TUBING CLEARLINK DUO-VENT - C-FLO (48EA/CA)

## (undated) DEVICE — LACTATED RINGERS INJ 1000 ML - (14EA/CA 60CA/PF)

## (undated) DEVICE — TUBE E-T HI-LO CUFF 7.0MM (10EA/PK)

## (undated) DEVICE — GOWN WARMING STANDARD FLEX - (30/CA)

## (undated) DEVICE — GLOVE BIOGEL PI INDICATOR SZ 6.5 SURGICAL PF LF - (50/BX 4BX/CA)

## (undated) DEVICE — KIT  I.V. START (100EA/CA)

## (undated) DEVICE — PATTIES SURG X-RAYCOTTONOID - 1/2 X 3 IN (200/CA)

## (undated) DEVICE — SUTURE GENERAL

## (undated) DEVICE — TUBE CONNECTING SUCTION - CLEAR PLASTIC STERILE 72 IN (50EA/CA)

## (undated) DEVICE — GLOVE SZ 7 BIOGEL PI MICRO - PF LF (50PR/BX 4BX/CA)

## (undated) DEVICE — ANTI-FOG SOLUTION - 60BTL/CA

## (undated) DEVICE — PACK ENT OR - (2EA/CA)

## (undated) DEVICE — SUTURE 3-0 ETHILON FS-1 - (36/BX) 30 INCH

## (undated) DEVICE — CATHETER IV 20 GA X 1-1/4 ---SURG.& SDS ONLY--- (50EA/BX)

## (undated) DEVICE — WATER IRRIGATION STERILE 1000ML (12EA/CA)

## (undated) DEVICE — SUCTION INSTRUMENT YANKAUER BULBOUS TIP W/O VENT (50EA/CA)

## (undated) DEVICE — CANISTER SUCTION 3000ML MECHANICAL FILTER AUTO SHUTOFF MEDI-VAC NONSTERILE LF DISP  (40EA/CA)